# Patient Record
Sex: FEMALE | Race: WHITE | Employment: FULL TIME | URBAN - METROPOLITAN AREA
[De-identification: names, ages, dates, MRNs, and addresses within clinical notes are randomized per-mention and may not be internally consistent; named-entity substitution may affect disease eponyms.]

---

## 2018-03-09 ENCOUNTER — OFFICE VISIT (OUTPATIENT)
Dept: FAMILY MEDICINE CLINIC | Age: 28
End: 2018-03-09

## 2018-03-09 VITALS
DIASTOLIC BLOOD PRESSURE: 81 MMHG | HEART RATE: 95 BPM | SYSTOLIC BLOOD PRESSURE: 129 MMHG | BODY MASS INDEX: 23.04 KG/M2 | WEIGHT: 130 LBS | TEMPERATURE: 99.5 F | OXYGEN SATURATION: 100 % | HEIGHT: 63 IN | RESPIRATION RATE: 16 BRPM

## 2018-03-09 DIAGNOSIS — R10.84 GENERALIZED ABDOMINAL PAIN: ICD-10-CM

## 2018-03-09 DIAGNOSIS — J30.89 ACUTE NON-SEASONAL ALLERGIC RHINITIS, UNSPECIFIED TRIGGER: ICD-10-CM

## 2018-03-09 DIAGNOSIS — Z13.220 SCREENING CHOLESTEROL LEVEL: ICD-10-CM

## 2018-03-09 DIAGNOSIS — F41.1 GENERALIZED ANXIETY DISORDER: ICD-10-CM

## 2018-03-09 DIAGNOSIS — Z91.018 FOOD ALLERGY: Primary | ICD-10-CM

## 2018-03-09 RX ORDER — ESCITALOPRAM OXALATE 5 MG/1
5 TABLET ORAL DAILY
Qty: 30 TAB | Refills: 3 | Status: SHIPPED | OUTPATIENT
Start: 2018-03-09 | End: 2018-03-09 | Stop reason: SDUPTHER

## 2018-03-09 RX ORDER — FLUTICASONE PROPIONATE 50 MCG
SPRAY, SUSPENSION (ML) NASAL
Qty: 1 BOTTLE | Refills: 2 | Status: SHIPPED | OUTPATIENT
Start: 2018-03-09 | End: 2019-10-16 | Stop reason: SDUPTHER

## 2018-03-09 RX ORDER — ALPRAZOLAM 0.25 MG/1
TABLET ORAL
COMMUNITY
End: 2018-08-10 | Stop reason: SDUPTHER

## 2018-03-09 RX ORDER — ESCITALOPRAM OXALATE 5 MG/1
TABLET ORAL DAILY
COMMUNITY
End: 2018-03-09 | Stop reason: SDUPTHER

## 2018-03-09 RX ORDER — NORGESTIMATE AND ETHINYL ESTRADIOL 7DAYSX3 LO
KIT ORAL
COMMUNITY
End: 2021-01-22 | Stop reason: ALTCHOICE

## 2018-03-09 NOTE — PROGRESS NOTES
Subjective:     HPI:  Shlomo Riggs is a 32 y.o. female new patient who presents today to establish care. Medical problems: Anxiety and allergies   Last visit with PCP: May 2017 in [address] [place] [address] labs: 1 year ago  Last PAP:  in [place]. Hx of abnormal pap smears: no  Patient's last menstrual period was 02/15/2018. Family history of GYN cancer: no   Family history of breast cancer: no   Last mammogram: never   Family history of colon cancer: no   Last colonoscopy: never   Flu shot: up-to-date  Father  of kidney cancer (Age 47). Blurry vision: Pt complains of 2-3 episodes of blurred vision in the past 2 years. She describes these episodes as \"Brain zaps\" where vision is blurry and she cannot move. GI: Pt complains of occasional lower abdominal cramps followed by Opal Freeman after eating certain food. Cramps are better after she has bowel movement. Anxiety: Pt takes Lexapro for anxiety. She takes Xanax as needed when she has anxiety attacks. Allergies: Pt takes Zyrtec for allergies. She desires prescription for Flonase as allergic symptoms has been worse since she moved here. Jaw pain: Pt complains of occasional jaw pain and headache. Episodes present for 2.5 years but more frequent after she moved here. Pain subsides when she puts her  at night. She reports that she used to get jaw adjusted by a chiropractor which provided good relief of pain. Menses: Pt reports that menses lasts 5 days. She switches sanitary napkin 3-4 times on her heavies day. Contraception: OCP. Of note,   Pt has a bachelors in social work. She plans to get masters online from [employer]. Her  is in the [employer]. She is sexually active with her . Current Outpatient Prescriptions   Medication Sig Dispense Refill    norgestimate-ethinyl estradiol (ORTHO TRI-CYCLEN LO, 28,) 0.18/0.215/0.25 mg-25 mcg tab Take  by mouth.  ALPRAZolam (XANAX) 0.25 mg tablet Take  by mouth.  escitalopram oxalate (LEXAPRO) 5 mg tablet Take 1 Tab by mouth daily. 30 Tab 3    fluticasone (FLONASE) 50 mcg/actuation nasal spray One spray each nostril up to twice a day. 1 Bottle 2        No Known Allergies    Past Medical History:   Diagnosis Date    Anxiety         History reviewed. No pertinent surgical history. Family History   Problem Relation Age of Onset    Anxiety Mother     Cancer Father        Social History     Social History    Marital status:      Spouse name: N/A    Number of children: N/A    Years of education: N/A     Occupational History    Not on file. Social History Main Topics    Smoking status: Never Smoker    Smokeless tobacco: Never Used    Alcohol use No    Drug use: No    Sexual activity: Yes     Partners: Male     Birth control/ protection: Pill     Other Topics Concern    Not on file     Social History Narrative    No narrative on file       REVIEW OF SYSTEM:  Review of Systems   Constitutional: Negative for chills and fever. Eyes: Negative for blurred vision. Respiratory: Negative for shortness of breath. Cardiovascular: Negative for chest pain, palpitations and leg swelling. Gastrointestinal: Positive for abdominal pain. Negative for constipation, diarrhea, nausea and vomiting. Musculoskeletal: Negative for joint pain. Neurological: Negative for headaches. Endo/Heme/Allergies: Positive for environmental allergies. Psychiatric/Behavioral: The patient is nervous/anxious. Objective:     Visit Vitals    /81 (BP 1 Location: Right arm, BP Patient Position: Sitting)    Pulse 95    Temp 99.5 °F (37.5 °C) (Oral)    Resp 16    Ht 5' 3\" (1.6 m)    Wt 130 lb (59 kg)    LMP 02/15/2018    SpO2 100%    BMI 23.03 kg/m2       PHYSICAL EXAM:  Physical Exam   Constitutional: She is oriented to person, place, and time and well-developed, well-nourished, and in no distress.    HENT:   Right Ear: Tympanic membrane, external ear and ear canal normal.   Left Ear: Tympanic membrane, external ear and ear canal normal.   Nose: Rhinorrhea (clear) present. Mouth/Throat: Oropharynx is clear and moist.   Eyes: Pupils are equal, round, and reactive to light. Neck: Normal range of motion. Neck supple. No thyromegaly present. Cardiovascular: Normal rate, regular rhythm, normal heart sounds and intact distal pulses. No murmur heard. Pulmonary/Chest: Effort normal and breath sounds normal. She has no wheezes. Neurological: She is alert and oriented to person, place, and time. GCS score is 15. Skin: Skin is warm and dry. Vitals reviewed. Assessment/Plan:       ICD-10-CM ICD-9-CM    1. Food allergy Z91.018 V15.05 REFERRAL TO ALLERGY   2. Generalized abdominal pain R10.84 789.07 REFERRAL TO ALLERGY   3. Generalized anxiety disorder F41.1 300.02 escitalopram oxalate (LEXAPRO) 5 mg tablet   4. Acute non-seasonal allergic rhinitis, unspecified trigger J30.89 477.9 fluticasone (FLONASE) 50 mcg/actuation nasal spray   5. Screening cholesterol level Z13.220 V77.91 LIPID PANEL      METABOLIC PANEL, COMPREHENSIVE     Patient given opportunity to ask questions. Questions answered. Non-fasting labs ordered today. Pt desires to get tested for food allergies. Patient understands plan of care. Follow-up Disposition:  Return if symptoms worsen or fail to improve. Written by Bandar Pinzon, as dictated by Rosey Weldon DO.    I, Dr. Rosey Weldon, confirm that all documentation is accurate.

## 2018-03-09 NOTE — MR AVS SNAPSHOT
[address] [place] [address] 
[phone] Patient: Chase Davis MRN: [id number] EOS:[id number]/76/[id number] Visit Information Date & Time Provider Department Dept. Phone Encounter #  
 3/9/2018  2:20 PM Giovana Victor, [facility] [phone] [id number] Follow-up Instructions Return if symptoms worsen or fail to improve. Upcoming Health Maintenance Date Due DTaP/Tdap/Td series (1 - Tdap) 12/15/2009 PAP AKA CERVICAL CYTOLOGY 12/15/2009 Influenza Age 5 to Adult 8/1/2017 Allergies as of 3/9/2018  Review Complete On: 3/9/2018 By: Giovana Victor, DO No Known Allergies Current Immunizations  Never Reviewed No immunizations on file. Not reviewed this visit You Were Diagnosed With   
  
 Codes Comments Food allergy    -  Primary ICD-10-CM: Z91.018 
ICD-9-CM: V15.05 Generalized abdominal pain     ICD-10-CM: R10.84 ICD-9-CM: 789.07 Generalized anxiety disorder     ICD-10-CM: F41.1 ICD-9-CM: 300.02 Acute non-seasonal allergic rhinitis, unspecified trigger     ICD-10-CM: J30.89 ICD-9-CM: 477.9 Screening cholesterol level     ICD-10-CM: U07.048 ICD-9-CM: V77.91 Vitals BP Pulse Temp Resp Height(growth percentile) Weight(growth percentile) 129/81 (BP 1 Location: Right arm, BP Patient Position: Sitting) 95 99.5 °F (37.5 °C) (Oral) 16 5' 3\" (1.6 m) 130 lb (59 kg) LMP SpO2 BMI Smoking Status 02/15/2018 100% 23.03 kg/m2 Never Smoker BMI and BSA Data Body Mass Index Body Surface Area 23.03 kg/m 2 1.62 m 2 Preferred Pharmacy Pharmacy Name Phone [facility] [address] [phone] Your Updated Medication List  
  
   
This list is accurate as of 3/9/18  3:14 PM.  Always use your most recent med list.  
  
  
  
  
 escitalopram oxalate 5 mg tablet Commonly known as:  Brianna Sewell Take 1 Tab by mouth daily. fluticasone 50 mcg/actuation nasal spray Commonly known as:  Pamela Gonzalez One spray each nostril up to twice a day. ORTHO TRI-CYCLEN LO (28) 0.18/0.215/0.25 mg-25 mcg Tab Generic drug:  norgestimate-ethinyl estradiol Take  by mouth. XANAX 0.25 mg tablet Generic drug:  ALPRAZolam  
Take  by mouth. Prescriptions Sent to Pharmacy Refills  
 escitalopram oxalate (LEXAPRO) 5 mg tablet 3 Sig: Take 1 Tab by mouth daily. Class: Normal  
 Pharmacy: [facility]  #: [phone] Route: Oral  
 fluticasone (FLONASE) 50 mcg/actuation nasal spray 2 Sig: One spray each nostril up to twice a day. Class: Normal  
 Pharmacy: [facility] [address]  #: [phone] We Performed the Following REFERRAL TO ALLERGY [REF5 Custom] Follow-up Instructions Return if symptoms worsen or fail to improve. To-Do List   
 03/09/2018 Lab:  LIPID PANEL   
  
 03/09/2018 Lab:  METABOLIC PANEL, COMPREHENSIVE Referral Information Referral ID Referred By Referred To  
  
 [id number] Mariam Terrance ANDERSON Not Available Visits Status Start Date End Date 1 New Request 3/9/18 3/9/19 If your referral has a status of pending review or denied, additional information will be sent to support the outcome of this decision. Patient Instructions Allergies: Care Instructions Your Care Instructions Allergies occur when your body's defense system (immune system) overreacts to certain substances. The immune system treats a harmless substance as if it were a harmful germ or virus. Many things can cause this overreaction, including pollens, medicine, food, dust, animal dander, and mold. Allergies can be mild or severe. Mild allergies can be managed with home treatment. But medicine may be needed to prevent problems. Managing your allergies is an important part of staying healthy. Your doctor may suggest that you have allergy testing to help find out what is causing your allergies. When you know what things trigger your symptoms, you can avoid them. This can prevent allergy symptoms and other health problems. For severe allergies that cause reactions that affect your whole body (anaphylactic reactions), your doctor may prescribe a shot of epinephrine to carry with you in case you have a severe reaction. Learn how to give yourself the shot and keep it with you at all times. Make sure it is not . Follow-up care is a key part of your treatment and safety. Be sure to make and go to all appointments, and call your doctor if you are having problems. It's also a good idea to know your test results and keep a list of the medicines you take. How can you care for yourself at home? · If you have been told by your doctor that dust or dust mites are causing your allergy, decrease the dust around your bed: 
[facility] AUTHORITY sheets, pillowcases, and other bedding in hot water every week. ¨ Use dust-proof covers for pillows, duvets, and mattresses. Avoid plastic covers because they tear easily and do not \"breathe. \" Wash as instructed on the label. ¨ Do not use any blankets and pillows that you do not need. ¨ Use blankets that you can wash in your washing machine. ¨ Consider removing drapes and carpets, which attract and hold dust, from your bedroom. · If you are allergic to house dust and mites, do not use home humidifiers. Your doctor can suggest ways you can control dust and mites. · Look for signs of cockroaches. Cockroaches cause allergic reactions. Use cockroach baits to get rid of them. Then, clean your home well.  Cockroaches like areas where grocery bags, newspapers, empty bottles, or cardboard boxes are stored. Do not keep these inside your home, and keep trash and food containers sealed. Seal off any spots where cockroaches might enter your home. · If you are allergic to mold, get rid of furniture, rugs, and drapes that smell musty. Check for mold in the bathroom. · If you are allergic to outdoor pollen or mold spores, use air-conditioning. Change or clean all filters every month. Keep windows closed. · If you are allergic to pollen, stay inside when pollen counts are high. Use a vacuum  with a HEPA filter or a double-thickness filter at least two times each week. · Stay inside when air pollution is bad. Avoid paint fumes, perfumes, and other strong odors. · Avoid conditions that make your allergies worse. Stay away from smoke. Do not smoke or let anyone else smoke in your house. Do not use fireplaces or wood-burning stoves. · If you are allergic to your pets, change the air filter in your furnace every month. Use high-efficiency filters. · If you are allergic to pet dander, keep pets outside or out of your bedroom. Old carpet and cloth furniture can hold a lot of animal dander. You may need to replace them. When should you call for help? Give an epinephrine shot if: 
? · You think you are having a severe allergic reaction. ? · You have symptoms in more than one body area, such as mild nausea and an itchy mouth. ? After giving an epinephrine shot call 911, even if you feel better. ?Call 911 if: 
? · You have symptoms of a severe allergic reaction. These may include: 
¨ Sudden raised, red areas (hives) all over your body. ¨ Swelling of the throat, mouth, lips, or tongue. ¨ Trouble breathing. ¨ Passing out (losing consciousness). Or you may feel very lightheaded or suddenly feel weak, confused, or restless. ? · You have been given an epinephrine shot, even if you feel better. ?Call your doctor now or seek immediate medical care if: ? · You have symptoms of an allergic reaction, such as: ¨ A rash or hives (raised, red areas on the skin). ¨ Itching. ¨ Swelling. ¨ Belly pain, nausea, or vomiting. ? Watch closely for changes in your health, and be sure to contact your doctor if: 
? · You do not get better as expected. Where can you learn more? Go to http://rizwana-rohith.info/. Enter D521 in the search box to learn more about \"Allergies: Care Instructions. \" Current as of: September 29, 2016 Content Version: 11.4 © 2107-9811 [employer]. Care instructions adapted under license by [employer] (which disclaims liability or warranty for this information). If you have questions about a medical condition or this instruction, always ask your healthcare professional. [address] any warranty or liability for your use of this information. Introducing [facility]! Carmelita Fothergill introduces [facility] patient portal. Now you can access parts of your medical record, email your doctor's office, and request medication refills online. 1. In your internet browser, go to [email or web address] 2. Click on the First Time User? Click Here link in the Sign In box. You will see the New Member Sign Up page. 3. Enter your [facility] [facility] Access Code exactly as it appears below. You will not need to use this code after youve completed the sign-up process. If you do not sign up before the expiration date, you must request a new code. · [employer] Access Code: [email or web address] Expires: 6/7/2018  2:03 PM 
 
4. Enter the last four digits of your Social Security Number (xxxx) and Date of Birth (mm/dd/yyyy) as indicated and click Submit. You will be taken to the next sign-up page. 5. Create a [employer] [facility] ID. This will be your [facility] [facility] login ID and cannot be changed, so think of one that is secure and easy to remember. 6. Create a [facility] password. You can change your password at any time. 7. Enter your Password Reset Question and Answer. This can be used at a later time if you forget your password. 8. Enter your e-mail address. You will receive e-mail notification when new information is available in [address] 9. Click Sign Up. You can now view and download portions of your medical record. 10. Click the Download Summary menu link to download a portable copy of your medical information. If you have questions, please visit the Frequently Asked Questions section of the [employer] website. Remember, [facility] is NOT to be used for urgent needs. For medical emergencies, dial 911. Now available from your iPhone and Android! Please provide this summary of care documentation to your next provider. If you have any questions after today's visit, please call [phone].

## 2018-03-09 NOTE — PATIENT INSTRUCTIONS
Allergies: Care Instructions  Your Care Instructions    Allergies occur when your body's defense system (immune system) overreacts to certain substances. The immune system treats a harmless substance as if it were a harmful germ or virus. Many things can cause this overreaction, including pollens, medicine, food, dust, animal dander, and mold. Allergies can be mild or severe. Mild allergies can be managed with home treatment. But medicine may be needed to prevent problems. Managing your allergies is an important part of staying healthy. Your doctor may suggest that you have allergy testing to help find out what is causing your allergies. When you know what things trigger your symptoms, you can avoid them. This can prevent allergy symptoms and other health problems. For severe allergies that cause reactions that affect your whole body (anaphylactic reactions), your doctor may prescribe a shot of epinephrine to carry with you in case you have a severe reaction. Learn how to give yourself the shot and keep it with you at all times. Make sure it is not . Follow-up care is a key part of your treatment and safety. Be sure to make and go to all appointments, and call your doctor if you are having problems. It's also a good idea to know your test results and keep a list of the medicines you take. How can you care for yourself at home? · If you have been told by your doctor that dust or dust mites are causing your allergy, decrease the dust around your bed:  [facility] AUTHORITY sheets, pillowcases, and other bedding in hot water every week. ¨ Use dust-proof covers for pillows, duvets, and mattresses. Avoid plastic covers because they tear easily and do not \"breathe. \" Wash as instructed on the label. ¨ Do not use any blankets and pillows that you do not need. ¨ Use blankets that you can wash in your washing machine. ¨ Consider removing drapes and carpets, which attract and hold dust, from your bedroom.   · If you are allergic to house dust and mites, do not use home humidifiers. Your doctor can suggest ways you can control dust and mites. · Look for signs of cockroaches. Cockroaches cause allergic reactions. Use cockroach baits to get rid of them. Then, clean your home well. Cockroaches like areas where grocery bags, newspapers, empty bottles, or cardboard boxes are stored. Do not keep these inside your home, and keep trash and food containers sealed. Seal off any spots where cockroaches might enter your home. · If you are allergic to mold, get rid of furniture, rugs, and drapes that smell musty. Check for mold in the bathroom. · If you are allergic to outdoor pollen or mold spores, use air-conditioning. Change or clean all filters every month. Keep windows closed. · If you are allergic to pollen, stay inside when pollen counts are high. Use a vacuum  with a HEPA filter or a double-thickness filter at least two times each week. · Stay inside when air pollution is bad. Avoid paint fumes, perfumes, and other strong odors. · Avoid conditions that make your allergies worse. Stay away from smoke. Do not smoke or let anyone else smoke in your house. Do not use fireplaces or wood-burning stoves. · If you are allergic to your pets, change the air filter in your furnace every month. Use high-efficiency filters. · If you are allergic to pet dander, keep pets outside or out of your bedroom. Old carpet and cloth furniture can hold a lot of animal dander. You may need to replace them. When should you call for help? Give an epinephrine shot if:  ? · You think you are having a severe allergic reaction. ? · You have symptoms in more than one body area, such as mild nausea and an itchy mouth. ? After giving an epinephrine shot call 911, even if you feel better. ?Call 911 if:  ? · You have symptoms of a severe allergic reaction. These may include:  ¨ Sudden raised, red areas (hives) all over your body.   ¨ Swelling of the throat, mouth, lips, or tongue. ¨ Trouble breathing. ¨ Passing out (losing consciousness). Or you may feel very lightheaded or suddenly feel weak, confused, or restless. ? · You have been given an epinephrine shot, even if you feel better. ?Call your doctor now or seek immediate medical care if:  ? · You have symptoms of an allergic reaction, such as:  ¨ A rash or hives (raised, red areas on the skin). ¨ Itching. ¨ Swelling. ¨ Belly pain, nausea, or vomiting. ? Watch closely for changes in your health, and be sure to contact your doctor if:  ? · You do not get better as expected. Where can you learn more? Go to http://rizwana-rohith.info/. Enter W475 in the search box to learn more about \"Allergies: Care Instructions. \"  Current as of: September 29, 2016  Content Version: 11.4  © 4916-2120 [employer]. Care instructions adapted under license by [employer] ( [employer] ) (which disclaims liability or warranty for this information). If you have questions about a medical condition or this instruction, always ask your healthcare professional. [place] [address] any warranty or liability for your use of this information.

## 2018-03-12 ENCOUNTER — HOSPITAL ENCOUNTER (OUTPATIENT)
Dept: LAB | Age: 28
Discharge: HOME | End: 2018-03-12
Payer: OTHER GOVERNMENT

## 2018-03-12 DIAGNOSIS — Z13.220 SCREENING CHOLESTEROL LEVEL: ICD-10-CM

## 2018-03-12 LAB
ALBUMIN SERPL-MCNC: 3.9 G/DL (ref 3.4–5)
ALBUMIN/GLOB SERPL: 1.1 {RATIO} (ref 0.8–1.7)
ALP SERPL-CCNC: 55 U/L (ref 45–117)
ALT SERPL-CCNC: 24 U/L (ref 13–56)
ANION GAP SERPL CALC-SCNC: 9 MMOL/L (ref 3–18)
AST SERPL-CCNC: 16 U/L (ref 15–37)
BILIRUB SERPL-MCNC: 0.3 MG/DL (ref 0.2–1)
BUN SERPL-MCNC: 14 MG/DL (ref 7–18)
BUN/CREAT SERPL: 18 (ref 12–20)
CALCIUM SERPL-MCNC: 8.8 MG/DL (ref 8.5–10.1)
CHLORIDE SERPL-SCNC: 106 MMOL/L (ref 100–108)
CHOLEST SERPL-MCNC: 176 MG/DL
CO2 SERPL-SCNC: 25 MMOL/L (ref 21–32)
CREAT SERPL-MCNC: 0.79 MG/DL (ref 0.6–1.3)
GLOBULIN SER CALC-MCNC: 3.6 G/DL (ref 2–4)
GLUCOSE SERPL-MCNC: 79 MG/DL (ref 74–99)
HDLC SERPL-MCNC: 52 MG/DL (ref 40–60)
HDLC SERPL: 3.4 {RATIO} (ref 0–5)
LDLC SERPL CALC-MCNC: 98.4 MG/DL (ref 0–100)
LIPID PROFILE,FLP: NORMAL
POTASSIUM SERPL-SCNC: 4 MMOL/L (ref 3.5–5.5)
PROT SERPL-MCNC: 7.5 G/DL (ref 6.4–8.2)
SODIUM SERPL-SCNC: 140 MMOL/L (ref 136–145)
TRIGL SERPL-MCNC: 128 MG/DL (ref ?–150)
VLDLC SERPL CALC-MCNC: 25.6 MG/DL

## 2018-03-12 PROCEDURE — 80061 LIPID PANEL: CPT | Performed by: FAMILY MEDICINE

## 2018-03-12 PROCEDURE — 80053 COMPREHEN METABOLIC PANEL: CPT | Performed by: FAMILY MEDICINE

## 2018-03-12 PROCEDURE — 36415 COLL VENOUS BLD VENIPUNCTURE: CPT | Performed by: FAMILY MEDICINE

## 2018-03-17 RX ORDER — ESCITALOPRAM OXALATE 5 MG/1
TABLET ORAL
Qty: 90 TAB | Refills: 1 | Status: SHIPPED | OUTPATIENT
Start: 2018-03-17 | End: 2018-08-10 | Stop reason: SDUPTHER

## 2018-06-06 ENCOUNTER — HOSPITAL ENCOUNTER (OUTPATIENT)
Dept: LAB | Age: 28
Discharge: HOME | End: 2018-06-06

## 2018-06-06 LAB — T-SPOT TB TEST (EMPLOYEE),XTBE: NORMAL

## 2018-06-06 PROCEDURE — 86787 VARICELLA-ZOSTER ANTIBODY: CPT | Performed by: EMERGENCY MEDICINE

## 2018-06-06 PROCEDURE — 86706 HEP B SURFACE ANTIBODY: CPT | Performed by: EMERGENCY MEDICINE

## 2018-06-06 PROCEDURE — 86762 RUBELLA ANTIBODY: CPT | Performed by: EMERGENCY MEDICINE

## 2018-06-06 PROCEDURE — 86765 RUBEOLA ANTIBODY: CPT | Performed by: EMERGENCY MEDICINE

## 2018-06-06 PROCEDURE — 86735 MUMPS ANTIBODY: CPT | Performed by: EMERGENCY MEDICINE

## 2018-06-07 LAB
HBV SURFACE AB SER QL IA: NEGATIVE
HBV SURFACE AB SERPL IA-ACNC: <3.1 MIU/ML
HEP BS AB COMMENT,HBSAC: ABNORMAL
MEV IGG SER IA-ACNC: >300 AU/ML
MUV IGG SER IA-ACNC: 57.4 AU/ML
RUBV IGG SER-IMP: NORMAL
VZV IGG SER IA-ACNC: 1410 INDEX

## 2018-08-10 ENCOUNTER — OFFICE VISIT (OUTPATIENT)
Dept: FAMILY MEDICINE CLINIC | Age: 28
End: 2018-08-10

## 2018-08-10 VITALS
DIASTOLIC BLOOD PRESSURE: 80 MMHG | SYSTOLIC BLOOD PRESSURE: 114 MMHG | WEIGHT: 129.6 LBS | HEIGHT: 63 IN | HEART RATE: 43 BPM | RESPIRATION RATE: 16 BRPM | OXYGEN SATURATION: 94 % | BODY MASS INDEX: 22.96 KG/M2 | TEMPERATURE: 98.1 F

## 2018-08-10 DIAGNOSIS — F41.1 GENERALIZED ANXIETY DISORDER: ICD-10-CM

## 2018-08-10 RX ORDER — ALPRAZOLAM 0.25 MG/1
0.25 TABLET ORAL
Qty: 30 TAB | Refills: 0 | Status: SHIPPED | OUTPATIENT
Start: 2018-08-10 | End: 2019-10-16 | Stop reason: SDUPTHER

## 2018-08-10 RX ORDER — ESCITALOPRAM OXALATE 5 MG/1
TABLET ORAL
Qty: 90 TAB | Refills: 1 | Status: SHIPPED | OUTPATIENT
Start: 2018-08-10 | End: 2018-08-10 | Stop reason: SDUPTHER

## 2018-08-10 RX ORDER — ESCITALOPRAM OXALATE 5 MG/1
TABLET ORAL
Qty: 90 TAB | Refills: 1 | Status: SHIPPED | OUTPATIENT
Start: 2018-08-10 | End: 2019-07-23 | Stop reason: SDUPTHER

## 2018-08-10 NOTE — PROGRESS NOTES
Teo Hunter is a 32 y.o. female presented to clinic for a medication refill for anxiety. Pt denies any pain and states that she is doing better because she has started a new job. 1. Have you been to the ER, urgent care clinic since your last visit? Hospitalized since your last visit? No    2. Have you seen or consulted any other health care providers outside of the [address] [place] [address] since your last visit? Include any pap smears or colon screening. No     Learning Assessment 3/9/2018   PRIMARY LEARNER Patient   PRIMARY LANGUAGE ENGLISH   LEARNER PREFERENCE PRIMARY DEMONSTRATION   ANSWERED BY patient   RELATIONSHIP SELF     Health Maintenance Due   Topic Date Due    DTaP/Tdap/Td series (1 - Tdap) 12/15/2009    PAP AKA CERVICAL CYTOLOGY  12/15/2009    Influenza Age 5 to Adult  08/01/2018          Patient verbally agrees to permit the Students working in [address] [place] [address] office to observe and participate in medical care during the appointment today, including, where appropriate, providing direct medical care to patient under the physicians direct supervision. Patient agrees that he/she have been given the opportunity to refuse to give such consent and may withdraw consent at any time during appointment.

## 2018-08-10 NOTE — MR AVS SNAPSHOT
[address] [place] [address] [phone] 
[phone] Patient: Ashley Payne MRN: [id number] LKB:[id number] Visit Information Date & Time Provider Department Dept. Phone Encounter #  
 8/10/2018  3:20 PM Hardik Loza, Deborah1 [facility] [phone] [id number] Upcoming Health Maintenance Date Due DTaP/Tdap/Td series (1 - Tdap) 12/15/2009 PAP AKA CERVICAL CYTOLOGY 12/15/2009 Influenza Age 5 to Adult 8/1/2018 Allergies as of 8/10/2018  Review Complete On: 8/10/2018 By: Hardik Loza, DO No Known Allergies Current Immunizations  Never Reviewed No immunizations on file. Not reviewed this visit You Were Diagnosed With   
  
 Codes Comments Generalized anxiety disorder     ICD-10-CM: F41.1 ICD-9-CM: 300.02 Vitals BP Pulse Temp Resp Height(growth percentile) Weight(growth percentile) 114/80 (BP 1 Location: Left arm, BP Patient Position: Sitting) (!) 43 98.1 °F (36.7 °C) (Oral) 16 5' 3\" (1.6 m) 129 lb 9.6 oz (58.8 kg) LMP SpO2 BMI OB Status Smoking Status 08/01/2018 (Exact Date) 94% 22.96 kg/m2 Having regular periods Never Smoker BMI and BSA Data Body Mass Index Body Surface Area  
 22.96 kg/m 2 1.62 m 2 Preferred Pharmacy Pharmacy Name Phone [place], [address] [phone] Your Updated Medication List  
  
   
This list is accurate as of 8/10/18  4:31 PM.  Always use your most recent med list.  
  
  
  
  
 ALPRAZolam 0.25 mg tablet Commonly known as:  Lito January Take 1 Tab by mouth nightly as needed for Anxiety. Max Daily Amount: 0.25 mg.  
  
 escitalopram oxalate 5 mg tablet Commonly known as:  Anne Dey TAKE 1 TABLET BY MOUTH DAILY  
  
 fluticasone 50 mcg/actuation nasal spray Commonly known as:  Elham Noyola One spray each nostril up to twice a day. ORTHO TRI-CYCLEN LO (28) 0.18/0.215/0.25 mg-25 mcg Tab Generic drug:  norgestimate-ethinyl estradiol Take  by mouth. Prescriptions Printed Refills ALPRAZolam (XANAX) 0.25 mg tablet 0 Sig: Take 1 Tab by mouth nightly as needed for Anxiety. Max Daily Amount: 0.25 mg.  
 Class: Print Route: Oral  
  
Prescriptions Sent to Pharmacy Refills  
 escitalopram oxalate (LEXAPRO) 5 mg tablet 1 Sig: TAKE 1 TABLET BY MOUTH DAILY Class: Normal  
 Pharmacy: [facility] [address] [place] [address], [address] [place] [address] #: [phone] Introducing [facility]! [facility] introduces [employer] patient portal. Now you can access parts of your medical record, email your doctor's office, and request medication refills online. 1. In your internet browser, go to [email or web address] 2. Click on the First Time User? Click Here link in the Sign In box. You will see the New Member Sign Up page. 3. Enter your Dom[employer] Access Code exactly as it appears below. You will not need to use this code after youve completed the sign-up process. If you do not sign up before the expiration date, you must request a new code. · [employer] Access Code: [email or web address] Expires: 11/8/2018  4:31 PM 
 
4. Enter the last four digits of your Social Security Number (xxxx) and Date of Birth (mm/dd/yyyy) as indicated and click Submit. You will be taken to the next sign-up page. 5. Create a Testtt ID. This will be your Domobios login ID and cannot be changed, so think of one that is secure and easy to remember. 6. Create a Domobios password. You can change your password at any time. 7. Enter your Password Reset Question and Answer. This can be used at a later time if you forget your password. 8. Enter your e-mail address. You will receive e-mail notification when new information is available in [address] 9. Click Sign Up. You can now view and download portions of your medical record. 10. Click the Download Summary menu link to download a portable copy of your medical information. If you have questions, please visit the Frequently Asked Questions section of the [employer] website. Remember, [employer] is NOT to be used for urgent needs. For medical emergencies, dial 911. Now available from your iPhone and Android! Please provide this summary of care documentation to your next provider. If you have any questions after today's visit, please call [phone].

## 2018-08-10 NOTE — PROGRESS NOTES
Subjective:     HPI:  Makenna Ta is a 32 y.o. female who presents to the office for routine care and medication refills. Anxiety: Pt takes Lexapro for anxiety. She states she feels the medication is effective. She denies any side-effects. She takes Xanax as needed when she has anxiety attacks, which she reports has not been very often. She states when she takes the Xanax she usually takes half of a tablet at night. 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 She states the job is stressful. Blurred vision: Pt complains of occasional blurred vision. She states sometimes when her jaw hurts due to TMJ she experiences blurred vision as well as when she is traveling through the [place] two and from work. Jaw pain: Pt reports she has jaw pain when she is stressed. She states she was given a mouth guard to wear at night. She goes on to say she takes ibuprofen as needed for pain. Allergy: Pt reports she has eliminated olive oil from her diet and she has not experienced the abdominal pain she complained of previously. Pulse: Pt reports yesterday she felt very tired, but today she has not. Heart rate in office today is 43. Of note, Last pap smear 11/2017. Labs reviewed:   Lab Results   Component Value Date/Time    Sodium 140 03/12/2018 12:53 PM    Potassium 4.0 03/12/2018 12:53 PM    Chloride 106 03/12/2018 12:53 PM    CO2 25 03/12/2018 12:53 PM    Anion gap 9 03/12/2018 12:53 PM    Glucose 79 03/12/2018 12:53 PM    BUN 14 03/12/2018 12:53 PM    Creatinine 0.79 03/12/2018 12:53 PM    BUN/Creatinine ratio 18 03/12/2018 12:53 PM    GFR est AA >60 03/12/2018 12:53 PM    GFR est non-AA >60 03/12/2018 12:53 PM    Calcium 8.8 03/12/2018 12:53 PM    Bilirubin, total 0.3 03/12/2018 12:53 PM    AST (SGOT) 16 03/12/2018 12:53 PM    Alk.  phosphatase 55 03/12/2018 12:53 PM    Protein, total 7.5 03/12/2018 12:53 PM    Albumin 3.9 03/12/2018 12:53 PM    Globulin 3.6 03/12/2018 12:53 PM    A-G Ratio 1.1 03/12/2018 12:53 PM    ALT (SGPT) 24 03/12/2018 12:53 PM     Lab Results   Component Value Date/Time    Cholesterol, total 176 03/12/2018 12:53 PM    HDL Cholesterol 52 03/12/2018 12:53 PM    LDL, calculated 98.4 03/12/2018 12:53 PM    VLDL, calculated 25.6 03/12/2018 12:53 PM    Triglyceride 128 03/12/2018 12:53 PM    CHOL/HDL Ratio 3.4 03/12/2018 12:53 PM     Current Outpatient Prescriptions   Medication Sig Dispense Refill    ALPRAZolam (XANAX) 0.25 mg tablet Take 1 Tab by mouth nightly as needed for Anxiety. Max Daily Amount: 0.25 mg. 30 Tab 0    escitalopram oxalate (LEXAPRO) 5 mg tablet TAKE 1 TABLET BY MOUTH DAILY 90 Tab 1    norgestimate-ethinyl estradiol (ORTHO TRI-CYCLEN LO, 28,) 0.18/0.215/0.25 mg-25 mcg tab Take  by mouth.  fluticasone (FLONASE) 50 mcg/actuation nasal spray One spray each nostril up to twice a day. 1 Bottle 2        No Known Allergies    Past Medical History:   Diagnosis Date    Anxiety         No past surgical history on file. Family History   Problem Relation Age of Onset    Anxiety Mother     Cancer Father        Social History     Social History    Marital status:      Spouse name: N/A    Number of children: N/A    Years of education: N/A     Occupational History    Not on file. Social History Main Topics    Smoking status: Never Smoker    Smokeless tobacco: Never Used    Alcohol use No    Drug use: No    Sexual activity: Yes     Partners: Male     Birth control/ protection: Pill     Other Topics Concern    Not on file     Social History Narrative       REVIEW OF SYSTEM:  Review of Systems   Constitutional: Negative for chills and fever. Eyes: Positive for blurred vision. Respiratory: Negative for shortness of breath. Cardiovascular: Negative for chest pain, palpitations and leg swelling. Gastrointestinal: Negative for constipation, diarrhea, nausea and vomiting. Musculoskeletal: Positive for joint pain. Neurological: Negative for headaches. Psychiatric/Behavioral: The patient is nervous/anxious. Objective:     Visit Vitals    /80 (BP 1 Location: Left arm, BP Patient Position: Sitting)    Pulse (!) 43    Temp 98.1 °F (36.7 °C) (Oral)    Resp 16    Ht 5' 3\" (1.6 m)    Wt 129 lb 9.6 oz (58.8 kg)    LMP 08/01/2018 (Exact Date)    SpO2 94%    BMI 22.96 kg/m2       PHYSICAL EXAM:  Physical Exam   Constitutional: She is oriented to person, place, and time and well-developed, well-nourished, and in no distress. HENT:   Right Ear: Tympanic membrane, external ear and ear canal normal.   Left Ear: Tympanic membrane, external ear and ear canal normal.   Nose: Nose normal.   Mouth/Throat: Posterior oropharyngeal erythema present. Eyes: Pupils are equal, round, and reactive to light. Neck: Normal range of motion. Neck supple. No thyromegaly present. Cardiovascular: Normal rate, regular rhythm, normal heart sounds and intact distal pulses. No murmur heard. Pulmonary/Chest: Effort normal and breath sounds normal. She has no wheezes. Neurological: She is alert and oriented to person, place, and time. GCS score is 15. Skin: Skin is warm and dry. Vitals reviewed. Assessment/Plan:       ICD-10-CM ICD-9-CM    1. Generalized anxiety disorder F41.1 300.02 ALPRAZolam (XANAX) 0.25 mg tablet      escitalopram oxalate (LEXAPRO) 5 mg tablet      DISCONTINUED: escitalopram oxalate (LEXAPRO) 5 mg tablet     Patient given opportunity to ask questions. Questions answered. Patient understands plan of care. Follow-up Disposition: Not on File    Written by Leodan Del Cid, as dictated by Zohra Bernal DO.    I, Dr. Zohra Bernal, confirm that all documentation is accurate.

## 2018-12-11 ENCOUNTER — TELEPHONE (OUTPATIENT)
Dept: FAMILY MEDICINE CLINIC | Age: 28
End: 2018-12-11

## 2019-07-23 DIAGNOSIS — F41.1 GENERALIZED ANXIETY DISORDER: ICD-10-CM

## 2019-07-23 NOTE — TELEPHONE ENCOUNTER
Pt called in and asked for a refill on her lexapro and has an upcoming appt with Audelia Lundberg on August 28th. Please advise.

## 2019-07-24 RX ORDER — ESCITALOPRAM OXALATE 5 MG/1
TABLET ORAL
Qty: 90 TAB | Refills: 0 | Status: SHIPPED | OUTPATIENT
Start: 2019-07-24 | End: 2019-10-16 | Stop reason: SDUPTHER

## 2019-07-24 NOTE — TELEPHONE ENCOUNTER
Pt was notified regarding refill request has been sent to the pharmacy. Pt stated that she will be arriving in [place] from [place] at 3pm and will be picking it up. Pt has no further question or concern when asked.

## 2019-10-16 ENCOUNTER — HOSPITAL ENCOUNTER (OUTPATIENT)
Dept: LAB | Age: 29
Discharge: HOME | End: 2019-10-16
Payer: OTHER GOVERNMENT

## 2019-10-16 ENCOUNTER — OFFICE VISIT (OUTPATIENT)
Dept: FAMILY MEDICINE CLINIC | Age: 29
End: 2019-10-16

## 2019-10-16 VITALS
DIASTOLIC BLOOD PRESSURE: 68 MMHG | TEMPERATURE: 97.8 F | SYSTOLIC BLOOD PRESSURE: 102 MMHG | HEART RATE: 82 BPM | WEIGHT: 132.4 LBS | HEIGHT: 63 IN | BODY MASS INDEX: 23.46 KG/M2 | OXYGEN SATURATION: 98 % | RESPIRATION RATE: 20 BRPM

## 2019-10-16 DIAGNOSIS — Z23 ENCOUNTER FOR IMMUNIZATION: ICD-10-CM

## 2019-10-16 DIAGNOSIS — F41.1 GENERALIZED ANXIETY DISORDER: ICD-10-CM

## 2019-10-16 DIAGNOSIS — J30.89 ACUTE NON-SEASONAL ALLERGIC RHINITIS: Primary | ICD-10-CM

## 2019-10-16 PROCEDURE — 88175 CYTOPATH C/V AUTO FLUID REDO: CPT

## 2019-10-16 PROCEDURE — 87624 HPV HI-RISK TYP POOLED RSLT: CPT

## 2019-10-16 RX ORDER — ALPRAZOLAM 0.25 MG/1
0.25 TABLET ORAL
Qty: 30 TAB | Refills: 0 | Status: SHIPPED | OUTPATIENT
Start: 2019-10-16 | End: 2021-01-22 | Stop reason: SDUPTHER

## 2019-10-16 RX ORDER — ESCITALOPRAM OXALATE 5 MG/1
TABLET ORAL
Qty: 90 TAB | Refills: 3 | Status: SHIPPED | OUTPATIENT
Start: 2019-10-16 | End: 2021-01-22 | Stop reason: SDUPTHER

## 2019-10-16 RX ORDER — FLUTICASONE PROPIONATE 50 MCG
SPRAY, SUSPENSION (ML) NASAL
Qty: 1 BOTTLE | Refills: 2 | Status: SHIPPED | OUTPATIENT
Start: 2019-10-16

## 2019-10-16 NOTE — PROGRESS NOTES
Veronica Tillman is a 25 y.o. female who presents for routine immunizations. She denies any symptoms , reactions or allergies that would exclude them from being immunized today. Risks and adverse reactions were discussed and the VIS was given to them. All questions were addressed. She was observed for 10 min post injection. There were no reactions observed.     Aimee Azul LPN

## 2019-10-16 NOTE — PROGRESS NOTES
1. Have you been to the ER, urgent care clinic since your last visit? Hospitalized since your last visit? Yes When: 10/2019 Where: [facility] Reason for visit: bronchitis    2. Have you seen or consulted any other health care providers outside of the [address] [place] [address] since your last visit? Include any pap smears or colon screening.  No

## 2019-10-17 NOTE — PROGRESS NOTES
Impression / Plan     Diagnoses and all orders for this visit:    1. Encounter for immunization  -     INFLUENZA VIRUS VAC QUAD,SPLIT,PRESV FREE SYRINGE IM  -     LA IMMUNIZ ADMIN,1 SINGLE/COMB VAC/TOXOID    2. Generalized anxiety disorder  -     escitalopram oxalate (LEXAPRO) 5 mg tablet; TAKE 1 TABLET BY MOUTH DAILY  -     ALPRAZolam (XANAX) 0.25 mg tablet; Take 1 Tab by mouth nightly as needed for Anxiety. Max Daily Amount: 0.25 mg.  -     CBC W/O DIFF; Future  -     TSH 3RD GENERATION; Future  -     METABOLIC PANEL, COMPREHENSIVE; Future  -     LIPID PANEL; Future    3. Acute non-seasonal allergic rhinitis  -     fluticasone propionate (FLONASE) 50 mcg/actuation nasal spray; One spray each nostril up to twice a day. Plan: Will continue with Lexapro and Xanax PRN, and screen for Thyroid Disease and Anemia secondary to long standing history of JULIET and RULE OUT secondary causes. Will add Lipid Panel per request. UTD Immunizations. Follow-up and Dispositions    · Return in about 6 months (around 4/16/2020) for JULIET. HPI   Lainey Jiang is a 25 y. o.female presenting for Renewal of Lexapro and Xanax PRN. Reports long standing history of JULIET, failure of prior WEAN, currently on lowest effective dosage. No SI or HI. No side effects. Medications     Outpatient Medications Prior to Visit   Medication Sig Dispense Refill    norgestimate-ethinyl estradiol (ORTHO TRI-CYCLEN LO, 28,) 0.18/0.215/0.25 mg-25 mcg tab Take  by mouth.  escitalopram oxalate (LEXAPRO) 5 mg tablet TAKE 1 TABLET BY MOUTH DAILY 90 Tab 0    ALPRAZolam (XANAX) 0.25 mg tablet Take 1 Tab by mouth nightly as needed for Anxiety. Max Daily Amount: 0.25 mg. 30 Tab 0    fluticasone (FLONASE) 50 mcg/actuation nasal spray One spray each nostril up to twice a day. 1 Bottle 2     No facility-administered medications prior to visit.          Allergies     No Known Allergies    Problem List     There are no active problems to display for this patient. Medical / Surgical / Family History     Past Medical History:   Diagnosis Date    Anxiety      No past surgical history on file. Family History   Problem Relation Age of Onset    Anxiety Mother     Cancer Father        Social History     Social History     Socioeconomic History    Marital status:      Spouse name: Not on file    Number of children: Not on file    Years of education: Not on file    Highest education level: Not on file   Occupational History    Not on file   Social Needs    Financial resource strain: Not on file    Food insecurity:     Worry: Not on file     Inability: Not on file    Transportation needs:     Medical: Not on file     Non-medical: Not on file   Tobacco Use    Smoking status: Never Smoker    Smokeless tobacco: Never Used   Substance and Sexual Activity    Alcohol use: No    Drug use: No    Sexual activity: Yes     Partners: Male     Birth control/protection: Pill   Lifestyle    Physical activity:     Days per week: Not on file     Minutes per session: Not on file    Stress: Not on file   Relationships    Social connections:     Talks on phone: Not on file     Gets together: Not on file     Attends Yazdanism service: Not on file     Active member of club or organization: Not on file     Attends meetings of clubs or organizations: Not on file     Relationship status: Not on file    Intimate partner violence:     Fear of current or ex partner: Not on file     Emotionally abused: Not on file     Physically abused: Not on file     Forced sexual activity: Not on file   Other Topics Concern    Not on file   Social History Narrative    Not on file        ROS   Review of Systems    10 Element ROS negative unless specifically stated in History of Present Illness.      Health Maintenance     Health Maintenance   Topic Date Due    DTaP/Tdap/Td series (1 - Tdap) 12/15/2009    PAP AKA CERVICAL CYTOLOGY  12/15/2009    Influenza Age 9 to Adult  Completed  Pneumococcal 0-64 years  Aged Out     Physical Exam   /68   Pulse 82   Temp 97.8 °F (36.6 °C) (Oral)   Resp 20   Ht 5' 3\" (1.6 m)   Wt 132 lb 6.4 oz (60.1 kg)   LMP 09/23/2019   SpO2 98%   BMI 23.45 kg/m²     Physical Exam   Constitutional: She is oriented to person, place, and time. She appears well-developed and well-nourished. No distress. HENT:   Head: Normocephalic and atraumatic. Eyes: Conjunctivae and EOM are normal.   Cardiovascular: Normal rate, regular rhythm, normal heart sounds and intact distal pulses. No murmur heard. Pulmonary/Chest: Effort normal and breath sounds normal. No respiratory distress. Neurological: She is alert and oriented to person, place, and time. No cranial nerve deficit. Coordination normal.   Skin: Skin is warm and dry. No rash noted. No erythema. Psychiatric: She has a normal mood and affect.  Her behavior is normal.     Ortho Exam    Nik Hollins DO

## 2019-10-23 ENCOUNTER — HOSPITAL ENCOUNTER (OUTPATIENT)
Dept: LAB | Age: 29
Discharge: HOME | End: 2019-10-23
Payer: OTHER GOVERNMENT

## 2019-10-23 DIAGNOSIS — F41.1 GENERALIZED ANXIETY DISORDER: ICD-10-CM

## 2019-10-23 LAB
ALBUMIN SERPL-MCNC: 3.7 G/DL (ref 3.4–5)
ALBUMIN/GLOB SERPL: 1.1 {RATIO} (ref 0.8–1.7)
ALP SERPL-CCNC: 50 U/L (ref 45–117)
ALT SERPL-CCNC: 24 U/L (ref 13–56)
ANION GAP SERPL CALC-SCNC: 5 MMOL/L (ref 3–18)
AST SERPL-CCNC: 16 U/L (ref 10–38)
BILIRUB SERPL-MCNC: 0.3 MG/DL (ref 0.2–1)
BUN SERPL-MCNC: 12 MG/DL (ref 7–18)
BUN/CREAT SERPL: 13 (ref 12–20)
CALCIUM SERPL-MCNC: 8.6 MG/DL (ref 8.5–10.1)
CHLORIDE SERPL-SCNC: 108 MMOL/L (ref 100–111)
CHOLEST SERPL-MCNC: 177 MG/DL
CO2 SERPL-SCNC: 27 MMOL/L (ref 21–32)
CREAT SERPL-MCNC: 0.9 MG/DL (ref 0.6–1.3)
ERYTHROCYTE [DISTWIDTH] IN BLOOD BY AUTOMATED COUNT: 12.9 % (ref 11.6–14.5)
GLOBULIN SER CALC-MCNC: 3.4 G/DL (ref 2–4)
GLUCOSE SERPL-MCNC: 79 MG/DL (ref 74–99)
HCT VFR BLD AUTO: 41 % (ref 35–45)
HDLC SERPL-MCNC: 43 MG/DL (ref 40–60)
HDLC SERPL: 4.1 {RATIO} (ref 0–5)
HGB BLD-MCNC: 13.8 G/DL (ref 12–16)
LDLC SERPL CALC-MCNC: 109.4 MG/DL (ref 0–100)
LIPID PROFILE,FLP: ABNORMAL
MCH RBC QN AUTO: 30.7 PG (ref 24–34)
MCHC RBC AUTO-ENTMCNC: 33.7 G/DL (ref 31–37)
MCV RBC AUTO: 91.1 FL (ref 74–97)
PLATELET # BLD AUTO: 280 K/UL (ref 135–420)
PMV BLD AUTO: 10.1 FL (ref 9.2–11.8)
POTASSIUM SERPL-SCNC: 4.1 MMOL/L (ref 3.5–5.5)
PROT SERPL-MCNC: 7.1 G/DL (ref 6.4–8.2)
RBC # BLD AUTO: 4.5 M/UL (ref 4.2–5.3)
SODIUM SERPL-SCNC: 140 MMOL/L (ref 136–145)
TRIGL SERPL-MCNC: 123 MG/DL (ref ?–150)
TSH SERPL DL<=0.05 MIU/L-ACNC: 2.06 UIU/ML (ref 0.36–3.74)
VLDLC SERPL CALC-MCNC: 24.6 MG/DL
WBC # BLD AUTO: 4.6 K/UL (ref 4.6–13.2)

## 2019-10-23 PROCEDURE — 36415 COLL VENOUS BLD VENIPUNCTURE: CPT

## 2019-10-23 PROCEDURE — 80061 LIPID PANEL: CPT

## 2019-10-23 PROCEDURE — 80053 COMPREHEN METABOLIC PANEL: CPT

## 2019-10-23 PROCEDURE — 84443 ASSAY THYROID STIM HORMONE: CPT

## 2019-10-23 PROCEDURE — 85027 COMPLETE CBC AUTOMATED: CPT

## 2020-12-07 ENCOUNTER — HOSPITAL ENCOUNTER (OUTPATIENT)
Dept: LAB | Age: 30
Discharge: HOME | End: 2020-12-07
Payer: OTHER GOVERNMENT

## 2020-12-07 PROCEDURE — 88175 CYTOPATH C/V AUTO FLUID REDO: CPT

## 2021-01-22 ENCOUNTER — VIRTUAL VISIT (OUTPATIENT)
Dept: FAMILY MEDICINE CLINIC | Age: 31
End: 2021-01-22
Payer: OTHER GOVERNMENT

## 2021-01-22 DIAGNOSIS — F41.1 GENERALIZED ANXIETY DISORDER: ICD-10-CM

## 2021-01-22 DIAGNOSIS — J30.2 SEASONAL ALLERGIC RHINITIS, UNSPECIFIED TRIGGER: Primary | ICD-10-CM

## 2021-01-22 PROCEDURE — 99214 OFFICE O/P EST MOD 30 MIN: CPT | Performed by: INTERNAL MEDICINE

## 2021-01-22 RX ORDER — ALPRAZOLAM 0.25 MG/1
0.25 TABLET ORAL
Qty: 30 TAB | Refills: 0 | Status: SHIPPED | OUTPATIENT
Start: 2021-01-22

## 2021-01-22 RX ORDER — ESCITALOPRAM OXALATE 5 MG/1
TABLET ORAL
Qty: 90 TAB | Refills: 1 | Status: SHIPPED | OUTPATIENT
Start: 2021-01-22 | End: 2021-09-03 | Stop reason: SDUPTHER

## 2021-01-22 NOTE — LETTER
NOTIFICATION RETURN TO WORK / SCHOOL 
 
1/22/2021 9:39 AM 
 
Ms. Rebecca Anne [address] [facility] [phone] To Whom It May Concern: 
 
Rebecca Anne is currently under the care of [address] [place] [address] Nabeel. She was seen in the office today. She will return to work/school on: 01/25/2020 If there are questions or concerns please have the patient contact our office. Sincerely, Miya Fakl MD

## 2021-01-22 NOTE — PROGRESS NOTES
Radha Zurita is a 26 y.o. female who was seen by synchronous (real-time) audio-video technology using doxy. me on 1/22/2021. Location of the patient: Home    Location of the provider: [facility]    Consent:  She and/or health care decision maker is aware that that she may receive a bill for this telehealth service, depending on her insurance coverage, and has provided verbal consent to proceed: Yes    Subjective:   Radha Zurita is a 26 y.o. female who presents today for management of    Chief Complaint   Patient presents with   [address]       Patient is here to establish care. Previous PCP: Dr. Mayra Raza Review:  Patient is seen for anxiety disorder. Current treatment includes Lexapro, Xanax and no other therapies. Anxiety is aggravated by stressors (toxic environment) at work. Ongoing symptoms include: racing thoughts, psychomotor agitation, difficulty concentrating. Patient denies: suicidal ideation, homocidal ideation. Reported side effects from the treatment: none. Allergic Rhinitis  Patient has history of allergic rhinitis. Symptoms are seasonal. Treatment in the past has included intranasal steroids: Flonase. Past Medical History  Past Medical History:   Diagnosis Date    Anxiety         Surgical History  Past Surgical History:   Procedure Laterality Date    HX WISDOM TEETH EXTRACTION         Current Medications  Current Outpatient Medications   Medication Sig    escitalopram oxalate (LEXAPRO) 5 mg tablet TAKE 1 TABLET BY MOUTH DAILY    ALPRAZolam (Xanax) 0.25 mg tablet Take 1 Tab by mouth daily as needed for Anxiety.  fluticasone propionate (FLONASE) 50 mcg/actuation nasal spray One spray each nostril up to twice a day. No current facility-administered medications for this visit.         Allergies/Drug Reactions  No Known Allergies     Social History  Social History     Tobacco Use    Smoking status: Never Smoker    Smokeless tobacco: Never Used Substance Use Topics    Alcohol use: No    Drug use: No        Review of Systems  Review of Systems   Constitutional: Negative for chills, fever, malaise/fatigue and weight loss. Respiratory: Negative. Cardiovascular: Negative. Gastrointestinal: Negative. Musculoskeletal: Negative. Neurological: Negative for dizziness and headaches. Psychiatric/Behavioral: Negative for depression, substance abuse and suicidal ideas. The patient is nervous/anxious. The patient does not have insomnia. Objective:     General: alert, cooperative, no distress   Mental  status: mental status: alert, oriented to person, place, and time, normal mood, behavior, speech, dress, motor activity, and thought processes   Resp: resp: normal effort and no respiratory distress   Neuro: neuro: no gross deficits   Skin: skin: no discoloration or lesions of concern on visible areas     Due to this being a TeleHealth evaluation, many elements of the physical examination are unable to be assessed. Assessment & Plan:   1. Generalized anxiety disorder  - stable  - Patient will file resignation in 2 weeks. - continue meds  - escitalopram oxalate (LEXAPRO) 5 mg tablet; TAKE 1 TABLET BY MOUTH DAILY  Dispense: 90 Tab; Refill: 1  - ALPRAZolam (Xanax) 0.25 mg tablet; Take 1 Tab by mouth daily as needed for Anxiety. Dispense: 30 Tab; Refill: 0    2. Seasonal allergic rhinitis, unspecified trigger  - controlled  - uses PRN Flonase      Follow-up and Dispositions    · Return in about 6 months (around 7/22/2021) for annual physical exam.         We discussed the expected course, resolution and complications of the diagnosis(es) in detail. Medication risks, benefits, costs, interactions, and alternatives were discussed as indicated. I advised her to contact the office if her condition worsens, changes or fails to improve as anticipated. She expressed understanding with the diagnosis(es) and plan.          Pursuant to the emergency declaration under the [facility] waiver authority and the [employer] and [employer] Act, this Virtual  Visit was conducted, with patient's consent, to reduce the patient's risk of exposure to COVID-19 and provide continuity of care for an established patient. Services were provided through a video synchronous discussion virtually to substitute for in-person clinic visit.     Kendall Pérez MD

## 2021-09-03 ENCOUNTER — VIRTUAL VISIT (OUTPATIENT)
Dept: FAMILY MEDICINE CLINIC | Age: 31
End: 2021-09-03
Payer: OTHER GOVERNMENT

## 2021-09-03 DIAGNOSIS — F41.1 GENERALIZED ANXIETY DISORDER: ICD-10-CM

## 2021-09-03 PROCEDURE — 99213 OFFICE O/P EST LOW 20 MIN: CPT | Performed by: INTERNAL MEDICINE

## 2021-09-03 RX ORDER — ESCITALOPRAM OXALATE 5 MG/1
TABLET ORAL
Qty: 90 TABLET | Refills: 1 | Status: SHIPPED | OUTPATIENT
Start: 2021-09-03

## 2021-09-03 NOTE — PROGRESS NOTES
Edilson Page is a 26 y.o. female who was seen by synchronous (real-time) audio-video technology using doxy. me on 9/3/2021. Location of the patient: Work    Location of the provider: [facility]    Consent:  She and/or health care decision maker is aware that that she may receive a bill for this telehealth service, depending on her insurance coverage, and has provided verbal consent to proceed: Yes    Subjective:   Edilson Page is a 26 y.o. female who presents today for management of    Chief Complaint   Patient presents with    Anxiety       Anxiety Review:  Patient is seen for anxiety disorder. Current treatment includes Lexapro, Xanax and no other therapies. Ongoing symptoms include: palpitations, paresthesias, insomnia, psychomotor agitation. Patient denies: suicidal ideation, homocidal ideation. Reported side effects from the treatment: none. Problem List  Patient Active Problem List    Diagnosis Date Noted    Generalized anxiety disorder 01/22/2021    Seasonal allergic rhinitis 01/22/2021       Current Medications  Current Outpatient Medications   Medication Sig    escitalopram oxalate (LEXAPRO) 5 mg tablet TAKE 1 TABLET BY MOUTH DAILY    ALPRAZolam (Xanax) 0.25 mg tablet Take 1 Tab by mouth daily as needed for Anxiety.  fluticasone propionate (FLONASE) 50 mcg/actuation nasal spray One spray each nostril up to twice a day. No current facility-administered medications for this visit. Allergies/Drug Reactions  No Known Allergies     Social History  Social History     Tobacco Use    Smoking status: Never Smoker    Smokeless tobacco: Never Used   Substance Use Topics    Alcohol use: No    Drug use: No        Review of Systems  Review of Systems   Constitutional: Negative. Respiratory: Negative. Cardiovascular: Negative. Gastrointestinal: Negative. Musculoskeletal: Negative. Neurological: Negative.             Objective:     General: alert, cooperative, no distress   Mental  status: mental status: alert, oriented to person, place, and time, normal mood, behavior, speech, dress, motor activity, and thought processes   Resp: resp: normal effort and no respiratory distress   Neuro: neuro: no gross deficits   Skin: skin: no discoloration or lesions of concern on visible areas     Due to this being a TeleHealth evaluation, many elements of the physical examination are unable to be assessed. Lab Results   Component Value Date/Time    TSH 2.06 10/23/2019 10:23 AM      Lab Results   Component Value Date/Time    Sodium 140 10/23/2019 10:23 AM    Potassium 4.1 10/23/2019 10:23 AM    Chloride 108 10/23/2019 10:23 AM    CO2 27 10/23/2019 10:23 AM    Anion gap 5 10/23/2019 10:23 AM    Glucose 79 10/23/2019 10:23 AM    BUN 12 10/23/2019 10:23 AM    Creatinine 0.90 10/23/2019 10:23 AM    BUN/Creatinine ratio 13 10/23/2019 10:23 AM    GFR est AA >60 10/23/2019 10:23 AM    GFR est non-AA >60 10/23/2019 10:23 AM    Calcium 8.6 10/23/2019 10:23 AM         Assessment & Plan:   1. Generalized anxiety disorder  - stable  - continue escitalopram oxalate (LEXAPRO) 5 mg tablet; TAKE 1 TABLET BY MOUTH DAILY  Dispense: 90 Tablet; Refill: 1  - PRN alprazolam      Follow-up and Dispositions    · Return in about 1 month (around 10/3/2021) for annual physical exam, in-person. We discussed the expected course, resolution and complications of the diagnosis(es) in detail. Medication risks, benefits, costs, interactions, and alternatives were discussed as indicated. I advised her to contact the office if her condition worsens, changes or fails to improve as anticipated. She expressed understanding with the diagnosis(es) and plan.          Pursuant to the emergency declaration under the [facility], [facility] waiver authority and the [employer] and [employer] General Act, this Virtual  Visit was conducted, with patient's consent, to reduce the patient's risk of exposure to COVID-19 and provide continuity of care for an established patient. Services were provided through a video synchronous discussion virtually to substitute for in-person clinic visit.     Nora Zabala MD

## 2021-10-22 ENCOUNTER — HOSPITAL ENCOUNTER (OUTPATIENT)
Dept: LAB | Age: 31
Discharge: HOME | End: 2021-10-22
Payer: OTHER GOVERNMENT

## 2021-10-22 ENCOUNTER — OFFICE VISIT (OUTPATIENT)
Dept: FAMILY MEDICINE CLINIC | Age: 31
End: 2021-10-22
Payer: OTHER GOVERNMENT

## 2021-10-22 VITALS
OXYGEN SATURATION: 100 % | HEIGHT: 63 IN | TEMPERATURE: 98.3 F | DIASTOLIC BLOOD PRESSURE: 70 MMHG | HEART RATE: 75 BPM | BODY MASS INDEX: 23.23 KG/M2 | WEIGHT: 131.08 LBS | SYSTOLIC BLOOD PRESSURE: 108 MMHG

## 2021-10-22 DIAGNOSIS — F41.1 GENERALIZED ANXIETY DISORDER: ICD-10-CM

## 2021-10-22 DIAGNOSIS — Z23 ENCOUNTER FOR IMMUNIZATION: ICD-10-CM

## 2021-10-22 DIAGNOSIS — Z00.00 ROUTINE GENERAL MEDICAL EXAMINATION AT A HEALTH CARE FACILITY: ICD-10-CM

## 2021-10-22 DIAGNOSIS — Z01.83 ENCOUNTER FOR BLOOD TYPING: ICD-10-CM

## 2021-10-22 DIAGNOSIS — Z23 NEEDS FLU SHOT: ICD-10-CM

## 2021-10-22 DIAGNOSIS — Z00.00 ROUTINE GENERAL MEDICAL EXAMINATION AT A HEALTH CARE FACILITY: Primary | ICD-10-CM

## 2021-10-22 DIAGNOSIS — Z11.59 NEED FOR HEPATITIS C SCREENING TEST: ICD-10-CM

## 2021-10-22 DIAGNOSIS — Z88.9 MULTIPLE ALLERGIES: ICD-10-CM

## 2021-10-22 LAB
ABO + RH BLD: NORMAL
ALBUMIN SERPL-MCNC: 4.2 G/DL (ref 3.4–5)
ALBUMIN/GLOB SERPL: 1.4 {RATIO} (ref 0.8–1.7)
ALP SERPL-CCNC: 69 U/L (ref 45–117)
ALT SERPL-CCNC: 42 U/L (ref 13–56)
ANION GAP SERPL CALC-SCNC: 4 MMOL/L (ref 3–18)
AST SERPL-CCNC: 21 U/L (ref 10–38)
BASOPHILS # BLD: 0 K/UL (ref 0–0.1)
BASOPHILS NFR BLD: 1 % (ref 0–2)
BILIRUB SERPL-MCNC: 0.7 MG/DL (ref 0.2–1)
BLOOD GROUP ANTIBODIES SERPL: NORMAL
BUN SERPL-MCNC: 14 MG/DL (ref 7–18)
BUN/CREAT SERPL: 18 (ref 12–20)
CALCIUM SERPL-MCNC: 9.2 MG/DL (ref 8.5–10.1)
CHLORIDE SERPL-SCNC: 107 MMOL/L (ref 100–111)
CHOLEST SERPL-MCNC: 178 MG/DL
CO2 SERPL-SCNC: 29 MMOL/L (ref 21–32)
CREAT SERPL-MCNC: 0.8 MG/DL (ref 0.6–1.3)
DIFFERENTIAL METHOD BLD: ABNORMAL
EOSINOPHIL # BLD: 0 K/UL (ref 0–0.4)
EOSINOPHIL NFR BLD: 1 % (ref 0–5)
ERYTHROCYTE [DISTWIDTH] IN BLOOD BY AUTOMATED COUNT: 12.1 % (ref 11.6–14.5)
EST. AVERAGE GLUCOSE BLD GHB EST-MCNC: 91 MG/DL
GLOBULIN SER CALC-MCNC: 3.1 G/DL (ref 2–4)
GLUCOSE SERPL-MCNC: 83 MG/DL (ref 74–99)
HBA1C MFR BLD: 4.8 % (ref 4.2–5.6)
HCT VFR BLD AUTO: 41.8 % (ref 35–45)
HDLC SERPL-MCNC: 54 MG/DL (ref 40–60)
HDLC SERPL: 3.3 {RATIO} (ref 0–5)
HGB BLD-MCNC: 14.1 G/DL (ref 12–16)
LDLC SERPL CALC-MCNC: 109.2 MG/DL (ref 0–100)
LIPID PROFILE,FLP: ABNORMAL
LYMPHOCYTES # BLD: 1.8 K/UL (ref 0.9–3.6)
LYMPHOCYTES NFR BLD: 41 % (ref 21–52)
MCH RBC QN AUTO: 31 PG (ref 24–34)
MCHC RBC AUTO-ENTMCNC: 33.7 G/DL (ref 31–37)
MCV RBC AUTO: 91.9 FL (ref 78–100)
MONOCYTES # BLD: 0.3 K/UL (ref 0.05–1.2)
MONOCYTES NFR BLD: 8 % (ref 3–10)
NEUTS SEG # BLD: 2.1 K/UL (ref 1.8–8)
NEUTS SEG NFR BLD: 49 % (ref 40–73)
PLATELET # BLD AUTO: 270 K/UL (ref 135–420)
PMV BLD AUTO: 9.8 FL (ref 9.2–11.8)
POTASSIUM SERPL-SCNC: 4 MMOL/L (ref 3.5–5.5)
PROT SERPL-MCNC: 7.3 G/DL (ref 6.4–8.2)
RBC # BLD AUTO: 4.55 M/UL (ref 4.2–5.3)
SODIUM SERPL-SCNC: 140 MMOL/L (ref 136–145)
SPECIMEN EXP DATE BLD: NORMAL
TRIGL SERPL-MCNC: 74 MG/DL (ref ?–150)
TSH SERPL DL<=0.05 MIU/L-ACNC: 2.24 UIU/ML (ref 0.36–3.74)
VLDLC SERPL CALC-MCNC: 14.8 MG/DL
WBC # BLD AUTO: 4.3 K/UL (ref 4.6–13.2)

## 2021-10-22 PROCEDURE — 36415 COLL VENOUS BLD VENIPUNCTURE: CPT

## 2021-10-22 PROCEDURE — 90686 IIV4 VACC NO PRSV 0.5 ML IM: CPT | Performed by: INTERNAL MEDICINE

## 2021-10-22 PROCEDURE — 80061 LIPID PANEL: CPT

## 2021-10-22 PROCEDURE — 90715 TDAP VACCINE 7 YRS/> IM: CPT | Performed by: INTERNAL MEDICINE

## 2021-10-22 PROCEDURE — 90472 IMMUNIZATION ADMIN EACH ADD: CPT | Performed by: INTERNAL MEDICINE

## 2021-10-22 PROCEDURE — 83036 HEMOGLOBIN GLYCOSYLATED A1C: CPT

## 2021-10-22 PROCEDURE — 86803 HEPATITIS C AB TEST: CPT

## 2021-10-22 PROCEDURE — 99395 PREV VISIT EST AGE 18-39: CPT | Performed by: INTERNAL MEDICINE

## 2021-10-22 PROCEDURE — 90471 IMMUNIZATION ADMIN: CPT | Performed by: INTERNAL MEDICINE

## 2021-10-22 PROCEDURE — 84443 ASSAY THYROID STIM HORMONE: CPT

## 2021-10-22 PROCEDURE — 85025 COMPLETE CBC W/AUTO DIFF WBC: CPT

## 2021-10-22 PROCEDURE — 80053 COMPREHEN METABOLIC PANEL: CPT

## 2021-10-22 PROCEDURE — 86901 BLOOD TYPING SEROLOGIC RH(D): CPT

## 2021-10-22 NOTE — PATIENT INSTRUCTIONS
Well Visit, Ages 25 to 48: Care Instructions  Overview     Well visits can help you stay healthy. Your doctor has checked your overall health and may have suggested ways to take good care of yourself. Your doctor also may have recommended tests. At home, you can help prevent illness with healthy eating, regular exercise, and other steps. Follow-up care is a key part of your treatment and safety. Be sure to make and go to all appointments, and call your doctor if you are having problems. It's also a good idea to know your test results and keep a list of the medicines you take. How can you care for yourself at home? · Get screening tests that you and your doctor decide on. Screening helps find diseases before any symptoms appear. · Eat healthy foods. Choose fruits, vegetables, whole grains, protein, and low-fat dairy foods. Limit fat, especially saturated fat. Reduce salt in your diet. · Limit alcohol. If you are a man, have no more than 2 drinks a day or 14 drinks a week. If you are a woman, have no more than 1 drink a day or 7 drinks a week. · Get at least 30 minutes of physical activity on most days of the week. Walking is a good choice. You also may want to do other activities, such as running, swimming, cycling, or playing tennis or team sports. Discuss any changes in your exercise program with your doctor. · Reach and stay at a healthy weight. This will lower your risk for many problems, such as obesity, diabetes, heart disease, and high blood pressure. · Do not smoke or allow others to smoke around you. If you need help quitting, talk to your doctor about stop-smoking programs and medicines. These can increase your chances of quitting for good. · Care for your mental health. It is easy to get weighed down by worry and stress. Learn strategies to manage stress, like deep breathing and mindfulness, and stay connected with your family and community.  If you find you often feel sad or hopeless, talk with your doctor. Treatment can help. · Talk to your doctor about whether you have any risk factors for sexually transmitted infections (STIs). You can help prevent STIs if you wait to have sex with a new partner (or partners) until you've each been tested for STIs. It also helps if you use condoms (male or female condoms) and if you limit your sex partners to one person who only has sex with you. Vaccines are available for some STIs, such as HPV. · Use birth control if it's important to you to prevent pregnancy. Talk with your doctor about the choices available and what might be best for you. · If you think you may have a problem with alcohol or drug use, talk to your doctor. This includes prescription medicines (such as amphetamines and opioids) and illegal drugs (such as cocaine and methamphetamine). Your doctor can help you figure out what type of treatment is best for you. · Protect your skin from too much sun. When you're outdoors from 10 a.m. to 4 p.m., stay in the shade or cover up with clothing and a hat with a wide brim. Wear sunglasses that block UV rays. Even when it's cloudy, put broad-spectrum sunscreen (SPF 30 or higher) on any exposed skin. · See a dentist one or two times a year for checkups and to have your teeth cleaned. · Wear a seat belt in the car. When should you call for help? Watch closely for changes in your health, and be sure to contact your doctor if you have any problems or symptoms that concern you. Where can you learn more? Go to http://www.[employer].com/  Enter P072 in the search box to learn more about \"Well Visit, Ages 25 to 48: Care Instructions. \"  Current as of: February 11, 2021               Content Version: 13.0  © 0435-9610 [employer], [employer]. Care instructions adapted under license by [employer] (which disclaims liability or warranty for this information).  If you have questions about a medical condition or this instruction, always ask your healthcare professional. [place] [address] any warranty or liability for your use of this information.     Davis Li MD  [id number] [facility], [address] [place] [address]  Ph: [phone]

## 2021-10-22 NOTE — PROGRESS NOTES
ANNUAL PHYSICAL EXAMINATION    History of Present Illness  Radha Zurita is a 26 y.o. female who presents today for management of    Chief Complaint   Patient presents with    Physical         Patient here for routine exam.  Current Complaints: Patient reports of latex allergies. She also had episodes of facial rash when using N95 masks. She stopped taking OTC niacin supplement which has stopped the flushing. Patient reports of small lesions on the scalp. She has had them for a few years. Denies pain, tenderness, discharge or itching. Gynecologic History  Patient's last menstrual period was Patient's last menstrual period was 10/15/2021 (approximate). .  Contraception: none  Last Pap: 12/2020  Results: normal    Health Maintenance  Colon cancer: due at age 37  Dyslipidemia: ordered today  Diabetes mellitus: ordered today  Influenza vaccine: due  Pneumococcal vaccine: not Indicated  Tdap: due  Herpes Zoster vaccine: due at age 46  Hep B vaccine: not Indicated   Weight: Body mass index is Estimated body mass index is Body mass index is 23.22 kg/m². Past Medical History  Past Medical History:   Diagnosis Date    Anxiety         Surgical History  Past Surgical History:   Procedure Laterality Date    HX WISDOM TEETH EXTRACTION          Current Medications  Current Outpatient Medications   Medication Sig    escitalopram oxalate (LEXAPRO) 5 mg tablet TAKE 1 TABLET BY MOUTH DAILY    ALPRAZolam (Xanax) 0.25 mg tablet Take 1 Tab by mouth daily as needed for Anxiety.  fluticasone propionate (FLONASE) 50 mcg/actuation nasal spray One spray each nostril up to twice a day. (Patient taking differently: As needed. One spray each nostril up to twice a day.)     No current facility-administered medications for this visit.        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 Use    Smoking status: Never Smoker    Smokeless tobacco: Never Used   Vaping Use    Vaping Use: Never used   Substance and Sexual Activity    Alcohol use: No    Drug use: No    Sexual activity: Yes     Partners: Male     Birth control/protection: Pill     Social Determinants of Health     Financial Resource Strain:     Difficulty of Paying Living Expenses:    Food Insecurity:     Worried About Running Out of Food in the Last Year:     [address] in the Last Year:    Transportation Needs:     Lack of Transportation (Medical):      Lack of Transportation (Non-Medical):    Physical Activity:     Days of Exercise per Week:     Minutes of Exercise per Session:    Stress:     Feeling of Stress :    Social Connections:     Frequency of Communication with Friends and Family:     Frequency of Social Gatherings with Friends and Family:     Attends Rastafari Services:     Active Member of Clubs or Organizations:     Attends Club or Organization Meetings:     Marital Status:         Health Maintenance   Topic Date Due    Hepatitis C Screening  Never done    DTaP/Tdap/Td series (1 - Tdap) Never done    Flu Vaccine (1) 09/01/2021    Cervical cancer screen  12/07/2025    COVID-19 Vaccine  Completed    Pneumococcal 0-64 years  Aged [employer] History   Administered Date(s) Administered    COVID-19, PFIZER, MRNA, LNP-S, PF, 30MCG/0.3ML DOSE 01/22/2021, 02/12/2021    Influenza Vaccine 10/01/2018    Influenza Vaccine (>6 mo Afluria QUAD Vial 49277 (0.25 mL) / 51516 (0.5 mL)) 10/25/2020    Influenza Vaccine (Quad) PF (>6 Mo Flulaval, Fluarix, and >3 Yrs Tiffanie Cruz [address]282) 10/16/2019       Review of Systems  General ROS: negative  Psychological ROS: negative  Ophthalmic ROS: negative  ENT ROS: negative  Allergy and Immunology ROS: negative  Hematological and Lymphatic ROS: negative  Endocrine ROS: negative  Breast ROS: negative for breast lumps  Respiratory ROS: no cough, shortness of breath, or wheezing  Cardiovascular ROS: no chest pain or dyspnea on exertion  Gastrointestinal ROS: no abdominal pain, change in bowel habits, or black or bloody stools  Genito-Urinary ROS: no dysuria, trouble voiding, or hematuria  Musculoskeletal ROS: negative  Neurological ROS: no TIA or stroke symptoms  Dermatological ROS: positive for - lesions on the scalp       Hearing Screening    125Hz 250Hz 500Hz 1000Hz 2000Hz 3000Hz 4000Hz 6000Hz 8000Hz   Right ear:   Pass Pass Pass  Pass     Left ear:   Pass Pass Pass  Pass        Visual Acuity Screening    Right eye Left eye Both eyes   Without correction:      With correction: 20/20 20/15 20/13         Physical Exam  Vital signs:   Vitals:    10/22/21 0829   BP: 108/70   Pulse: 75   Temp: 98.3 °F (36.8 °C)   TempSrc: Temporal   SpO2: 100%   Weight: 131 lb 1.3 oz (59.5 kg)   Height: 5' 3\" (1.6 m)       General: alert, oriented, not in distress  Head: scalp normal, atraumatic  Eyes: pupils are equal and reactive, full and intact EOM's  Ears: patent ear canal, intact tympanic membrane  Nose: normal turbinates, no congestion or discharge  Lips/Mouth: moist lips and buccal mucosa, non-enlarged tonsils, pink throat  Neck: supple, no JVD, no lymphadenopathy, non-palpable thyroid  Chest/Lungs: clear breath sounds, no wheezing or crackles  Heart: normal rate, regular rhythm, no murmur  Breasts: right breast normal without mass, skin or nipple changes or axillary nodes, left breast normal without mass, skin or nipple changes or axillary nodes  Abdomen: soft, non-distended, non-tender, normal bowel sounds, no organomegaly, no masses  Extremities: no focal deformities, no edema  Skin: no active skin lesions    Assessment/Plan:        ICD-10-CM ICD-9-CM    1. Routine general medical examination at a health care facility  Z00.00 V70.0 CBC WITH AUTOMATED DIFF      HEMOGLOBIN A1C WITH EAG      LIPID PANEL      METABOLIC PANEL, COMPREHENSIVE   2. Generalized anxiety disorder  F41.1 300.02 TSH 3RD GENERATION   3. Multiple allergies  Z88.9 V15.09 REFERRAL TO ALLERGY   4. Need for hepatitis C screening test  Z11.59 V73.89 HCV AB W/RFLX TO MARGUERITE   5. Encounter for blood typing  Z01.83 V72.86 TYPE & SCREEN   6. Needs flu shot  Z23 V04.81 INFLUENZA VIRUS VAC QUAD,SPLIT,PRESV FREE SYRINGE IM   7. Encounter for immunization  Z23 V03.89 TETANUS, DIPHTHERIA TOXOIDS AND ACELLULAR PERTUSSIS VACCINE (TDAP), IN INDIVIDS. >=7, IM       Patient Counseling:  --Nutrition: Stressed importance of moderation in sodium/caffeine intake, saturated fat and cholesterol, caloric balance, sufficient intake of fresh fruits, vegetables. --Exercise: Stressed the importance of regular exercise. --Dental health: Discussed importance of regular tooth brushing, flossing, and dental visits. --Immunizations reviewed. Follow-up and Dispositions    · Return in about 6 months (around 4/22/2022), or as needed, for ROV. I have discussed the diagnosis with the patient and the intended plan as seen in the above orders. The patient has received an after-visit summary and questions were answered concerning future plans. I have discussed medication side effects and warnings with the patient as well. I have reviewed the plan of care with the patient, accepted their input and they are in agreement with the treatment goals.        Navjot Price MD  October 22, 2021

## 2021-10-22 NOTE — PROGRESS NOTES
Chief Complaint   Patient presents with    Physical     1. Have you been to the ER, urgent care clinic since your last visit? Hospitalized since your last visit? No    2. Have you seen or consulted any other health care providers outside of the [address] [place] [address] since your last visit? Include any pap smears or colon screening. No     Health Maintenance Due   Topic Date Due    Hepatitis C Screening  Never done    DTaP/Tdap/Td series (1 - Tdap) Never done    Flu Vaccine (1) 09/01/2021     Patient was given VIS for review, consent was obtained and per orders of Dr. Himanshu Maguire, injection of flu vaccine and Tdap given by Sidra Lawson. Patient observed. No signs nor symptoms of any adverse reactions. Patient tolerated injection well.

## 2021-10-23 LAB
HCV AB S/CO SERPL IA: 0.2 S/CO RATIO (ref 0–0.9)
HCV AB SERPL QL IA: NORMAL